# Patient Record
Sex: FEMALE | Race: WHITE | NOT HISPANIC OR LATINO | Employment: FULL TIME | URBAN - METROPOLITAN AREA
[De-identification: names, ages, dates, MRNs, and addresses within clinical notes are randomized per-mention and may not be internally consistent; named-entity substitution may affect disease eponyms.]

---

## 2019-09-10 ENCOUNTER — OFFICE VISIT (OUTPATIENT)
Dept: URGENT CARE | Facility: CLINIC | Age: 39
End: 2019-09-10
Payer: COMMERCIAL

## 2019-09-10 VITALS
HEART RATE: 94 BPM | WEIGHT: 203 LBS | RESPIRATION RATE: 18 BRPM | OXYGEN SATURATION: 98 % | TEMPERATURE: 98.8 F | BODY MASS INDEX: 35.97 KG/M2 | HEIGHT: 63 IN | SYSTOLIC BLOOD PRESSURE: 143 MMHG | DIASTOLIC BLOOD PRESSURE: 98 MMHG

## 2019-09-10 DIAGNOSIS — B02.30 HERPES ZOSTER WITH OPHTHALMIC COMPLICATION, UNSPECIFIED HERPES ZOSTER EYE DISEASE: Primary | ICD-10-CM

## 2019-09-10 PROCEDURE — 99213 OFFICE O/P EST LOW 20 MIN: CPT | Performed by: PHYSICIAN ASSISTANT

## 2019-09-10 RX ORDER — ACETAMINOPHEN, ASPIRIN AND CAFFEINE 250; 250; 65 MG/1; MG/1; MG/1
1 TABLET, FILM COATED ORAL EVERY 6 HOURS PRN
COMMUNITY

## 2019-09-10 NOTE — PROGRESS NOTES
[facility] [facility]        NAME: Priscilla Corral is a 37 y o  female  : 1980    MRN: [id number]  DATE: September 10, 2019  TIME: 9:53 AM    Assessment and Plan   Herpes zoster with ophthalmic complication, unspecified herpes zoster eye disease [B02 30]  1  Herpes zoster with ophthalmic complication, unspecified herpes zoster eye disease           Patient Instructions     Do not allow other to come in direct skin to skin contact with rash to avoid transmission   Take Ibuprofen/Tylenol for pain  Cool compresses over area  Talk to your PCP about varicella zoster vaccine  Follow up with eye doctor within 24 hours  Follow up with PCP in 3-5 days  Proceed to  ER if symptoms worsen  Chief Complaint     Chief Complaint   Patient presents with    Rash     Rash on right side of scalp, forhead, and into the eyelid  Onset about 5 days ago         History of Present Illness       States she was outside with nats before it happened  Patient did not have shingles vaccine  She had varicella as a child  Rash   This is a new problem  Episode onset: 5d  The problem has been gradually worsening since onset  Location: R side of scalp, forehead, eyelid  The rash is characterized by pain and redness (6/10 pain)  Associated symptoms include facial edema  Pertinent negatives include no eye pain, fever or shortness of breath  Treatments tried: eye wash solution; benadryl  Review of Systems   Review of Systems   Constitutional: Negative for chills and fever  HENT: Negative for trouble swallowing  Eyes: Negative for photophobia, pain, discharge, redness, itching and visual disturbance  Respiratory: Negative for shortness of breath, wheezing and stridor  Cardiovascular: Negative for chest pain  Gastrointestinal: Negative for abdominal pain  Musculoskeletal: Negative for arthralgias  Skin: Positive for rash           Current Medications       Current Outpatient Medications:    aspirin-acetaminophen-caffeine (EXCEDRIN MIGRAINE) 250-250-65 MG per tablet, Take 1 tablet by mouth every 6 (six) hours as needed for headaches, Disp: , Rfl:     Current Allergies     Allergies as of 09/10/2019    (No Known Allergies)            The following portions of the patient's history were reviewed and updated as appropriate: allergies, current medications, past family history, past medical history, past social history, past surgical history and problem list      Past Medical History:   Diagnosis Date    Headache        Past Surgical History:   Procedure Laterality Date    OVARIAN CYST REMOVAL         History reviewed  No pertinent family history  Medications have been verified  Objective   /98   Pulse 94   Temp 98 8 °F (37 1 °C) (Tympanic)   Resp 18   Ht 5' 3" (1 6 m)   Wt 92 1 kg (203 lb)   LMP 08/25/2019 (Exact Date)   SpO2 98%   BMI 35 96 kg/m²        Physical Exam     Physical Exam   Constitutional: She appears well-developed and well-nourished  No distress  HENT:   Mouth/Throat: Oropharynx is clear and moist    Eyes: Pupils are equal, round, and reactive to light  EOM are normal  Right eye exhibits no discharge  Left eye exhibits no discharge  L upper lid edema and erythema  L vision 20/20; R vision 20/20   Cardiovascular: Normal rate, regular rhythm and normal heart sounds  Exam reveals no gallop and no friction rub  No murmur heard  Pulmonary/Chest: Effort normal and breath sounds normal  No respiratory distress  She has no wheezes  She has no rales  She exhibits no tenderness  Lymphadenopathy:     She has no cervical adenopathy  Neurological: She is alert  Skin: Skin is warm  Rash noted  Psychiatric: She has a normal mood and affect   Her behavior is normal  Judgment and thought content normal

## 2019-09-10 NOTE — PATIENT INSTRUCTIONS
Do not allow other to come in direct skin to skin contact with rash to avoid transmission   Take Ibuprofen/Tylenol for pain  Cool compresses over area  Talk to your PCP about varicella zoster vaccine  Follow up with eye doctor within 24 hours  Follow up with PCP in 3-5 days  Proceed to  ER if symptoms worsen  Shingles   WHAT YOU NEED TO KNOW:   Shingles is a painful infection caused by the same virus that causes chickenpox (varicella-zoster virus)  After you get chickenpox, the virus stays in your body for several years without causing any symptoms  Shingles occurs when the virus becomes active again  Once active, the virus will travel along a nerve to your skin and cause a rash  DISCHARGE INSTRUCTIONS:   Return to the emergency department if:   · You have painful, red, warm skin around the blisters, or the blisters drain pus  · Your neck is stiff or you have trouble moving it  · You have trouble moving your arms, legs, or face  · You have a seizure  · You have weakness in an arm or leg  · You become confused, or have difficulty speaking  · You have dizziness, a severe headache, hearing or vision loss  Contact your healthcare provider if:   · You feel weak or have a headache  · You have a cough, chills, or a fever  · You have abdominal pain, nausea, or vomiting  · Your rash becomes more itchy or painful  · Your rash spreads to other parts of your body  · Your pain worsens and does not go away even after taking medicine  · You have questions or concerns about your condition or care  Medicines:   · Antiviral medicine  helps decrease symptoms and healing time  They may also decrease your risk of developing nerve pain  You will need to start taking them within 3 days of the start of symptoms to prevent nerve pain  · Pain medicine  may be prescribed or suggested by your healthcare provider   You may need NSAIDs, acetaminophen, or opioid medicine depending on how much pain you are in  Do not wait until the pain is severe before you take more pain medicine  · Topical anesthetics  are used to numb the skin and decrease pain  They can be a cream, gel, spray, or patch  · Anticonvulsants  decrease nerve pain and may help you sleep at night  · Antidepressants  may be used to decrease nerve pain  · Take your medicine as directed  Contact your healthcare provider if you think your medicine is not helping or if you have side effects  Tell him of her if you are allergic to any medicine  Keep a list of the medicines, vitamins, and herbs you take  Include the amounts, and when and why you take them  Bring the list or the pill bottles to follow-up visits  Carry your medicine list with you in case of an emergency  Follow up with your healthcare provider as directed:  Write down your questions so you remember to ask them during your visits  Self-care:  Keep your rash clean and dry  Cover your rash with a bandage or clothing  Do not use bandages that stick to your skin  The sticky part may irritate your skin and make your rash last longer  Prevent the spread of shingles: The virus can be passed to a person who has never had chickenpox  This person may get chickenpox, but not shingles  You may pass the virus to others as long as you have a rash  The virus is spread by direct contact with the fluid from the blisters  Usually, you cannot spread the virus once the blisters dry up  Prevent shingles or another shingles outbreak:  A vaccine may be given to help prevent shingles  Ask for more information about this vaccine  © 2017 [employer] Information is for End User's use only and may not be sold, redistributed or otherwise used for commercial purposes  All illustrations and images included in CareNotes® are the copyrighted property of [employer]  or Tejas Dunn  The above information is an  only   It is not intended as medical advice for individual conditions or treatments  Talk to your doctor, nurse or pharmacist before following any medical regimen to see if it is safe and effective for you